# Patient Record
Sex: MALE | Race: BLACK OR AFRICAN AMERICAN | NOT HISPANIC OR LATINO | URBAN - METROPOLITAN AREA
[De-identification: names, ages, dates, MRNs, and addresses within clinical notes are randomized per-mention and may not be internally consistent; named-entity substitution may affect disease eponyms.]

---

## 2023-11-04 ENCOUNTER — EMERGENCY (EMERGENCY)
Facility: HOSPITAL | Age: 23
LOS: 1 days | Discharge: HOME | End: 2023-11-04
Attending: EMERGENCY MEDICINE | Admitting: EMERGENCY MEDICINE
Payer: COMMERCIAL

## 2023-11-04 VITALS
DIASTOLIC BLOOD PRESSURE: 65 MMHG | HEART RATE: 64 BPM | RESPIRATION RATE: 18 BRPM | SYSTOLIC BLOOD PRESSURE: 132 MMHG | TEMPERATURE: 98 F | OXYGEN SATURATION: 100 % | HEIGHT: 76 IN

## 2023-11-04 PROCEDURE — 99284 EMERGENCY DEPT VISIT MOD MDM: CPT

## 2023-11-04 NOTE — ED ADULT TRIAGE NOTE - NS ED NURSE BANDS TYPE
Detail Level: Simple
Additional Notes: Patient consent was obtained to proceed with the visit and recommended plan of care after discussion of all risks and benefits, including the risks of COVID-19 exposure.
Name band;

## 2023-11-04 NOTE — ED ADULT TRIAGE NOTE - CHIEF COMPLAINT QUOTE
c/o "piece of metal in right eye." pt endorses pain with movement, no vision changes, swelling or redness. referred to ED by ophthalmologist. no hx

## 2023-11-05 RX ORDER — OFLOXACIN 0.3 %
1 DROPS OPHTHALMIC (EYE) ONCE
Refills: 0 | Status: COMPLETED | OUTPATIENT
Start: 2023-11-05 | End: 2023-11-05

## 2023-11-05 RX ORDER — ERYTHROMYCIN BASE 5 MG/GRAM
1 OINTMENT (GRAM) OPHTHALMIC (EYE) ONCE
Refills: 0 | Status: COMPLETED | OUTPATIENT
Start: 2023-11-05 | End: 2023-11-05

## 2023-11-05 RX ADMIN — Medication 1 DROP(S): at 00:48

## 2023-11-05 RX ADMIN — Medication 1 APPLICATION(S): at 00:48

## 2023-11-05 NOTE — ED PROVIDER NOTE - ENMT, MLM
Airway patent, Nasal mucosa clear. Mouth with normal mucosa. Throat has no vesicles, no oropharyngeal exudates and uvula is midline. neg lacrimation, conjunctival injection

## 2023-11-05 NOTE — ED ADULT NURSE NOTE - OBJECTIVE STATEMENT
Pt received in intake 15. Pt alert and oriented x 4, ambulatory at baseline. Pt denies pertinent medical history. Pt c/o a foreign object in his right eye and pain with movement rated 4/10 that began 3 days ago. Pt denies vision changes, redness, swelling. Pt seen at outpatient ophthalmologist that referred him ot the ED. Respirations even and unlabored. No facial trauma, eye redness or swelling. Pt denies chest pains, shortness of breath, N/V/D, headache, dizziness, weakness, fever, chills. Pt medicated per MD orders.

## 2023-11-05 NOTE — ED PROVIDER NOTE - PATIENT PORTAL LINK FT
You can access the FollowMyHealth Patient Portal offered by [facility] by registering at the following website: [email or web address]. By joining [employer]’s FollowMyHealth portal, you will also be able to view your health information using other applications (apps) compatible with our system.

## 2023-11-05 NOTE — ED PROVIDER NOTE - OBJECTIVE STATEMENT
22-year-old male with past medical history presents to the emergency department with foreign body sensation right eye sent by ophthalmology for middle finger with rust ring.No visual changes with mild irritation with rubbing but otherwise no visual changesSent by ophthalmology due to inability to in the office.  Was actually evaluated for [place] presents here request requesting removal

## 2023-11-05 NOTE — CONSULT NOTE ADULT - SUBJECTIVE AND OBJECTIVE BOX
[facility] DEPARTMENT OF OPHTHALMOLOGY - INITIAL ADULT CONSULT  ----------------------------------------------------------------------------------------------------  Marcelo Raymond MD PGY-2  Available on teams  ----------------------------------------------------------------------------------------------------    HPI: 22-year-old male with past medical history presents to the emergency department with foreign body sensation right eye sent by ophthalmology for middle finger with rust ring.No visual changes with mild irritation with rubbing but otherwise no visual changesSent by ophthalmology due to inability to in the office.  Was actually evaluated for [place] presents here request requesting removal    Interval History: Pt states he got a piece of metal into the right eye 2-3 days ago. Pt sent to see an OD at [facility] today and they were unable to remove the FB. He was referred to see a cornea specialist but he did not want to wait until next week and presented to the ED. Pt denying any pain or vision changes. Just has some FBS.    PAST MEDICAL & SURGICAL HISTORY:    Past Ocular History: LASIK OU  Ophthalmic Medications: none  FAMILY HISTORY:    Social History: denies etoh/smoking    MEDICATIONS  (STANDING):    MEDICATIONS  (PRN):    Allergies & Intolerances:   No Known Allergies    Review of Systems:  Constitutional: No fever, chills  Eyes: No blurry vision, flashes, floaters, erythema, discharge, double vision, OU  Neuro: No tremors  Cardiovascular: No chest pain, palpitations  Respiratory: No SOB, no cough  GI: No nausea, vomiting, abdominal pain  : No dysuria  Skin: no rash  Psych: no depression  Endocrine: no polyuria, polydipsia  Heme/lymph: no swelling    VITALS: T(C): 36.8 (11-04-23 @ 21:19)  T(F): 98.2 (11-04-23 @ 21:19), Max: 98.7 (11-04-23 @ 20:37)  HR: 64 (11-04-23 @ 21:19) (62 - 64)  BP: 132/65 (11-04-23 @ 21:19) (117/68 - 132/65)  RR:  (18 - 18)  SpO2:  (100% - 100%)  Wt(kg): --  General: AAO x 3, appropriate mood and affect    Ophthalmology Exam:  Visual acuity (sc): 20/20 OD, 20/20 OS  Pupils: PERRL OU, no APD  Ttono: 15 OD, 13 OS  Extraocular movements (EOMs): Full OU, no pain, no diplopia  Confrontational Visual Field (CVF): Full OU    Slit Lamp Exam (PLE)  External: Flat OU  Lids/Lashes/Lacrimal Ducts: Flat OU    Sclera/Conjunctiva: W+Q OU  Cornea: LASIK Flaps in place OU. small paracentral metallic FB vs rust ring with no epi defect, old corneal scar OD, Cl OS  Anterior Chamber: D+F OU    Iris: Flat OU  Lens: Cl OU    Fundus Exam: dilated with 1% tropicamide and 2.5% phenylephrine  Approval obtained from primary team for dilation  Patient aware that pupils can remained dilated for at least 4-6 hours  Exam performed with 20D lens    Vitreous: wnl OU  Disc, cup/disc: sharp and pink, 0.3 OU  Macula: wnl OU  Vessels: wnl OU  Periphery: wnl OU    Labs/Imaging:  ***   [facility] DEPARTMENT OF OPHTHALMOLOGY - INITIAL ADULT CONSULT  ----------------------------------------------------------------------------------------------------  Marcelo Raymond MD PGY-2  Available on teams  ----------------------------------------------------------------------------------------------------    HPI: 22-year-old male with past medical history presents to the emergency department with foreign body sensation right eye sent by ophthalmology for middle finger with rust ring.No visual changes with mild irritation with rubbing but otherwise no visual changesSent by ophthalmology due to inability to in the office.  Was actually evaluated for [place] presents here request requesting removal    Interval History: Pt states he got a piece of metal into the right eye 2-3 days ago. Pt sent to see an OD at [facility] today and they were unable to remove the FB. He was referred to see a cornea specialist but he did not want to wait until next week and presented to the ED. Pt denying any pain or vision changes. Just has some FBS.    PAST MEDICAL & SURGICAL HISTORY:    Past Ocular History: LASIK OU  Ophthalmic Medications: none  FAMILY HISTORY:    Social History: denies etoh/smoking    MEDICATIONS  (STANDING):    MEDICATIONS  (PRN):    Allergies & Intolerances:   No Known Allergies    Review of Systems:  Constitutional: No fever, chills  Eyes: No blurry vision, flashes, floaters, erythema, discharge, double vision, OU  Neuro: No tremors  Cardiovascular: No chest pain, palpitations  Respiratory: No SOB, no cough  GI: No nausea, vomiting, abdominal pain  : No dysuria  Skin: no rash  Psych: no depression  Endocrine: no polyuria, polydipsia  Heme/lymph: no swelling    VITALS: T(C): 36.8 (11-04-23 @ 21:19)  T(F): 98.2 (11-04-23 @ 21:19), Max: 98.7 (11-04-23 @ 20:37)  HR: 64 (11-04-23 @ 21:19) (62 - 64)  BP: 132/65 (11-04-23 @ 21:19) (117/68 - 132/65)  RR:  (18 - 18)  SpO2:  (100% - 100%)  Wt(kg): --  General: AAO x 3, appropriate mood and affect    Ophthalmology Exam:  Visual acuity (sc): 20/20 OD, 20/20 OS  Pupils: PERRL OU, no APD  Ttono: 15 OD, 13 OS  Extraocular movements (EOMs): Full OU, no pain, no diplopia  Confrontational Visual Field (CVF): Full OU    Slit Lamp Exam (PLE)  External: Flat OU  Lids/Lashes/Lacrimal Ducts: Flat OU    Sclera/Conjunctiva: W+Q OU  Cornea: LASIK Flaps in place OU. small paracentral metallic FB vs rust ring with no epi defect, old corneal scar OD, Cl OS  Anterior Chamber: D+F OU    Iris: Flat OU  Lens: Cl OU    Fundus Exam: dilated with 1% tropicamide and 2.5% phenylephrine  Approval obtained from primary team for dilation  Patient aware that pupils can remained dilated for at least 4-6 hours  Exam performed with 20D lens    Vitreous: wnl OU  Disc, cup/disc: sharp and pink, 0.3 OU  Macula: wnl OU  Vessels: wnl OU  Periphery: wnl OU    Labs/Imaging:  ***   [facility] DEPARTMENT OF OPHTHALMOLOGY - INITIAL ADULT CONSULT  ----------------------------------------------------------------------------------------------------  Marcelo Raymond MD PGY-2  Available on teams  ----------------------------------------------------------------------------------------------------    HPI: 22-year-old male with past medical history presents to the emergency department with foreign body sensation right eye sent by ophthalmology for middle finger with rust ring.No visual changes with mild irritation with rubbing but otherwise no visual changesSent by ophthalmology due to inability to in the office.  Was actually evaluated for [place] presents here request requesting removal    Interval History: Pt states he got a piece of metal into the right eye 2-3 days ago. Pt sent to see an OD at [facility] today and they were unable to remove the FB. He was referred to see a cornea specialist but he did not want to wait until next week and presented to the ED. Pt denying any pain or vision changes. Just has some FBS.    PAST MEDICAL & SURGICAL HISTORY:    Past Ocular History: LASIK OU  Ophthalmic Medications: none  FAMILY HISTORY:    Social History: denies etoh/smoking    MEDICATIONS  (STANDING):    MEDICATIONS  (PRN):    Allergies & Intolerances:   No Known Allergies    Review of Systems:  Constitutional: No fever, chills  Eyes: No blurry vision, flashes, floaters, erythema, discharge, double vision, OU  Neuro: No tremors  Cardiovascular: No chest pain, palpitations  Respiratory: No SOB, no cough  GI: No nausea, vomiting, abdominal pain  : No dysuria  Skin: no rash  Psych: no depression  Endocrine: no polyuria, polydipsia  Heme/lymph: no swelling    VITALS: T(C): 36.8 (11-04-23 @ 21:19)  T(F): 98.2 (11-04-23 @ 21:19), Max: 98.7 (11-04-23 @ 20:37)  HR: 64 (11-04-23 @ 21:19) (62 - 64)  BP: 132/65 (11-04-23 @ 21:19) (117/68 - 132/65)  RR:  (18 - 18)  SpO2:  (100% - 100%)  Wt(kg): --  General: AAO x 3, appropriate mood and affect    Ophthalmology Exam:  Visual acuity (sc): 20/20 OD, 20/20 OS  Pupils: PERRL OU, no APD  Ttono: 15 OD, 13 OS  Extraocular movements (EOMs): Full OU, no pain, no diplopia  Confrontational Visual Field (CVF): Full OU    Slit Lamp Exam (PLE)  External: Flat OU  Lids/Lashes/Lacrimal Ducts: Flat OU    Sclera/Conjunctiva: W+Q OU  Cornea: LASIK Flaps in place OU. small paracentral metallic FB vs rust ring with no epi defect, old corneal scar OD, Cl OS  Anterior Chamber: D+F OU    Iris: Flat OU  Lens: Cl OU    Fundus Exam: dilated with 1% tropicamide and 2.5% phenylephrine  Approval obtained from primary team for dilation  Patient aware that pupils can remained dilated for at least 4-6 hours  Exam performed with 20D lens    Vitreous: wnl OU  Disc, cup/disc: sharp and pink, 0.3 OU  Macula: wnl OU  Vessels: wnl OU  Periphery: wnl OU    Labs/Imaging:  ***   [facility] DEPARTMENT OF OPHTHALMOLOGY - INITIAL ADULT CONSULT  ----------------------------------------------------------------------------------------------------  Marcelo Raymond MD PGY-2  Available on teams  ----------------------------------------------------------------------------------------------------    HPI: 22-year-old male with past medical history presents to the emergency department with foreign body sensation right eye sent by ophthalmology for middle finger with rust ring.No visual changes with mild irritation with rubbing but otherwise no visual changesSent by ophthalmology due to inability to in the office.  Was actually evaluated for [place] presents here request requesting removal    Interval History: Pt states he got a piece of metal into the right eye 2-3 days ago. Pt sent to see an OD at [facility] today and they were unable to remove the FB. He was referred to see a cornea specialist but he did not want to wait until next week and presented to the ED. Pt denying any pain or vision changes. Just has some FBS.    PAST MEDICAL & SURGICAL HISTORY:    Past Ocular History: LASIK OU  Ophthalmic Medications: none  FAMILY HISTORY:    Social History: denies etoh/smoking    MEDICATIONS  (STANDING):    MEDICATIONS  (PRN):    Allergies & Intolerances:   No Known Allergies    Review of Systems:  Constitutional: No fever, chills  Eyes: No blurry vision, flashes, floaters, erythema, discharge, double vision, OU  Neuro: No tremors  Cardiovascular: No chest pain, palpitations  Respiratory: No SOB, no cough  GI: No nausea, vomiting, abdominal pain  : No dysuria  Skin: no rash  Psych: no depression  Endocrine: no polyuria, polydipsia  Heme/lymph: no swelling    VITALS: T(C): 36.8 (11-04-23 @ 21:19)  T(F): 98.2 (11-04-23 @ 21:19), Max: 98.7 (11-04-23 @ 20:37)  HR: 64 (11-04-23 @ 21:19) (62 - 64)  BP: 132/65 (11-04-23 @ 21:19) (117/68 - 132/65)  RR:  (18 - 18)  SpO2:  (100% - 100%)  Wt(kg): --  General: AAO x 3, appropriate mood and affect    Ophthalmology Exam:  Visual acuity (sc): 20/20 OD, 20/20 OS  Pupils: PERRL OU, no APD  Ttono: 15 OD, 13 OS  Extraocular movements (EOMs): Full OU, no pain, no diplopia  Confrontational Visual Field (CVF): Full OU    Slit Lamp Exam (PLE)  External: Flat OU  Lids/Lashes/Lacrimal Ducts: Flat OU    Sclera/Conjunctiva: W+Q OU  Cornea: LASIK Flaps in place OU. small paracentral metallic FB vs rust ring with no epi defect, old corneal scar OD, Cl OS  Anterior Chamber: D+F OU    Iris: Flat OU  Lens: Cl OU    Fundus Exam: dilated with 1% tropicamide and 2.5% phenylephrine  Approval obtained from primary team for dilation  Patient aware that pupils can remained dilated for at least 4-6 hours  Exam performed with 20D lens    Vitreous: wnl OU  Disc, cup/disc: sharp and pink, 0.3 OU  Macula: wnl OU  Vessels: wnl OU  Periphery: wnl OU    Labs/Imaging:  ***

## 2023-11-05 NOTE — ED PROVIDER NOTE - CLINICAL SUMMARY MEDICAL DECISION MAKING FREE TEXT BOX
Patient evaluated for foreign body. Patient evaluated for foreign body. optho can not remove FB and will f/u with optho in 2 days on monday. no visual changes, no lacrimation, rhinorrhea.

## 2023-11-05 NOTE — CONSULT NOTE ADULT - ASSESSMENT
22y male with a past medical history/ocular history of LASIK OU consulted for corneal FB.    #Corneal foreign body, right eye  - Pt got a piece of metal in his eye while working 3-4 days ago and went to see OD today and it was unable to be removed. Pt was referred to see cornea specialist but wanted the FB removed sooner so he presented to ED  - On exam pt's VA is 20/20, no RAPD, EOM full, CVF full  - Pt found to have a small metallic FB vs rust ring in the right paracentral cornea near the visual axis  - Pt did not tolerate removal at the slit lamp, pt was unable to keep eyes open and maintain gaze position to allow for safe removal of the metallic FB  - Will have pt start oflox gtt QID and erythromycin ointment qhs to the left eye  - Will attempt removal in clinic    Outpatient Follow-up: Patient should follow-up with his/her ophthalmologist or with [facility] Department of Ophthalmology within 1 week of after discharge at:    [address]  [place] [address]  [phone]    Marcelo Raymond MD, PGY-2  Also available on [employer] Teams     22y male with a past medical history/ocular history of LASIK OU consulted for corneal FB.    #Corneal foreign body, right eye  - Pt got a piece of metal in his eye while working 3-4 days ago and went to see OD today and it was unable to be removed. Pt was referred to see cornea specialist but wanted the FB removed sooner so he presented to ED  - On exam pt's VA is 20/20, no RAPD, EOM full, CVF full  - Pt found to have a small metallic FB vs rust ring in the right paracentral cornea near the visual axis  - Pt did not tolerate removal at the slit lamp, pt was unable to keep eyes open and maintain gaze position to allow for safe removal of the metallic FB  - Will have pt start oflox gtt QID and erythromycin ointment qhs to the left eye  - Will attempt removal in clinic    Outpatient Follow-up: Patient should follow-up with his/her ophthalmologist or with [facility] Department of Ophthalmology within 1 week of after discharge at:    [address]  [place] [address]  [phone]    Marcelo Raymond MD, PGY-2  Also available on [employer] Teams     22y male with a past medical history/ocular history of LASIK OU consulted for corneal FB.    #Corneal foreign body, right eye  - Pt got a piece of metal in his eye while working 3-4 days ago and went to see OD today and it was unable to be removed. Pt was referred to see cornea specialist but wanted the FB removed sooner so he presented to ED  - On exam pt's VA is 20/20, no RAPD, EOM full, CVF full  - Pt found to have a small metallic FB vs rust ring in the right paracentral cornea near the visual axis  - Pt did not tolerate removal at the slit lamp, pt was unable to keep eyes open and maintain gaze position to allow for safe removal of the metallic FB  - Will have pt start oflox gtt QID and erythromycin ointment qhs to the left eye  - Will attempt removal in clinic    Outpatient Follow-up: Patient should follow-up with his/her ophthalmologist or with [facility] Department of Ophthalmology within 1 week of after discharge at:    [address]  [place] [address]  [phone]    Marcelo Raymond MD, PGY-2  Also available on [employer] Teams     22y male with a past medical history/ocular history of LASIK OU consulted for corneal FB.    #Corneal foreign body, right eye  - Pt got a piece of metal in his eye while working 3-4 days ago and went to see OD today and it was unable to be removed. Pt was referred to see cornea specialist but wanted the FB removed sooner so he presented to ED  - On exam pt's VA is 20/20, no RAPD, EOM full, CVF full  - Pt found to have a small metallic FB vs rust ring in the right paracentral cornea near the visual axis  - Pt did not tolerate removal at the slit lamp, pt was unable to keep eyes open and maintain gaze position to allow for safe removal of the metallic FB  - Will have pt start oflox gtt QID and erythromycin ointment qhs to the left eye  - Will attempt removal in clinic    Outpatient Follow-up: Patient should follow-up with his/her ophthalmologist or with [facility] Department of Ophthalmology within 1 week of after discharge at:    [address]  [place] [address]  [phone]    Marcelo Raymond MD, PGY-2  Also available on [employer] Teams

## 2023-11-07 ENCOUNTER — APPOINTMENT (OUTPATIENT)
Dept: OPHTHALMOLOGY | Facility: CLINIC | Age: 23
End: 2023-11-07
Payer: COMMERCIAL

## 2023-11-07 ENCOUNTER — NON-APPOINTMENT (OUTPATIENT)
Age: 23
End: 2023-11-07

## 2023-11-07 PROCEDURE — 92004 COMPRE OPH EXAM NEW PT 1/>: CPT | Mod: 25

## 2023-11-07 PROCEDURE — 65222 REMOVE FOREIGN BODY FROM EYE: CPT | Mod: RT

## 2023-11-07 PROCEDURE — 92285 EXTERNAL OCULAR PHOTOGRAPHY: CPT

## 2023-11-10 ENCOUNTER — APPOINTMENT (OUTPATIENT)
Dept: OPHTHALMOLOGY | Facility: CLINIC | Age: 23
End: 2023-11-10
Payer: COMMERCIAL

## 2023-11-10 ENCOUNTER — NON-APPOINTMENT (OUTPATIENT)
Age: 23
End: 2023-11-10

## 2023-11-10 PROCEDURE — 92012 INTRM OPH EXAM EST PATIENT: CPT

## 2023-12-01 ENCOUNTER — APPOINTMENT (OUTPATIENT)
Dept: OPHTHALMOLOGY | Facility: CLINIC | Age: 23
End: 2023-12-01
Payer: COMMERCIAL

## 2023-12-01 ENCOUNTER — NON-APPOINTMENT (OUTPATIENT)
Age: 23
End: 2023-12-01

## 2023-12-01 PROCEDURE — 92012 INTRM OPH EXAM EST PATIENT: CPT

## 2024-01-19 PROBLEM — Z00.00 ENCOUNTER FOR PREVENTIVE HEALTH EXAMINATION: Status: ACTIVE | Noted: 2024-01-19

## 2024-02-29 ENCOUNTER — APPOINTMENT (OUTPATIENT)
Dept: INTERNAL MEDICINE | Facility: CLINIC | Age: 24
End: 2024-02-29